# Patient Record
Sex: FEMALE | Race: OTHER | HISPANIC OR LATINO | URBAN - METROPOLITAN AREA
[De-identification: names, ages, dates, MRNs, and addresses within clinical notes are randomized per-mention and may not be internally consistent; named-entity substitution may affect disease eponyms.]

---

## 2024-04-22 ENCOUNTER — EMERGENCY (EMERGENCY)
Facility: HOSPITAL | Age: 69
LOS: 1 days | Discharge: HOME | End: 2024-04-22
Attending: EMERGENCY MEDICINE | Admitting: EMERGENCY MEDICINE
Payer: COMMERCIAL

## 2024-04-22 VITALS
OXYGEN SATURATION: 96 % | RESPIRATION RATE: 16 BRPM | DIASTOLIC BLOOD PRESSURE: 85 MMHG | TEMPERATURE: 97 F | HEART RATE: 59 BPM | SYSTOLIC BLOOD PRESSURE: 147 MMHG

## 2024-04-22 VITALS
SYSTOLIC BLOOD PRESSURE: 131 MMHG | HEIGHT: 70 IN | TEMPERATURE: 98 F | HEART RATE: 69 BPM | WEIGHT: 160.06 LBS | RESPIRATION RATE: 16 BRPM | OXYGEN SATURATION: 95 % | DIASTOLIC BLOOD PRESSURE: 76 MMHG
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
## 2024-04-22 DIAGNOSIS — I10 ESSENTIAL (PRIMARY) HYPERTENSION: ICD-10-CM

## 2024-04-22 DIAGNOSIS — S62.316A DISPLACED FRACTURE OF BASE OF FIFTH METACARPAL BONE, RIGHT HAND, INITIAL ENCOUNTER FOR CLOSED FRACTURE: ICD-10-CM

## 2024-04-22 DIAGNOSIS — Z79.01 LONG TERM (CURRENT) USE OF ANTICOAGULANTS: ICD-10-CM

## 2024-04-22 DIAGNOSIS — S00.83XA CONTUSION OF OTHER PART OF HEAD, INITIAL ENCOUNTER: ICD-10-CM

## 2024-04-22 DIAGNOSIS — S69.91XA UNSPECIFIED INJURY OF RIGHT WRIST, HAND AND FINGER(S), INITIAL ENCOUNTER: ICD-10-CM
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
## 2024-04-22 DIAGNOSIS — Z86.73 PERSONAL HISTORY OF TRANSIENT ISCHEMIC ATTACK (TIA), AND CEREBRAL INFARCTION WITHOUT RESIDUAL DEFICITS: ICD-10-CM

## 2024-04-22 PROCEDURE — 70486 CT MAXILLOFACIAL W/O DYE: CPT | Mod: MC

## 2024-04-22 PROCEDURE — 73130 X-RAY EXAM OF HAND: CPT | Mod: 26,RT

## 2024-04-22 PROCEDURE — 70450 CT HEAD/BRAIN W/O DYE: CPT | Mod: MC

## 2024-04-22 PROCEDURE — 70486 CT MAXILLOFACIAL W/O DYE: CPT | Mod: 26,MC

## 2024-04-22 PROCEDURE — 99284 EMERGENCY DEPT VISIT MOD MDM: CPT | Mod: 25

## 2024-04-22 PROCEDURE — 99285 EMERGENCY DEPT VISIT HI MDM: CPT | Mod: 57

## 2024-04-22 PROCEDURE — 73130 X-RAY EXAM OF HAND: CPT

## 2024-04-22 PROCEDURE — 29125 APPL SHORT ARM SPLINT STATIC: CPT | Mod: RT

## 2024-04-22 PROCEDURE — 70450 CT HEAD/BRAIN W/O DYE: CPT | Mod: 26,MC

## 2024-04-22 PROCEDURE — 26600 TREAT METACARPAL FRACTURE: CPT | Mod: 54,RT

## 2024-04-22 RX ORDER — ACETAMINOPHEN 500 MG
1000 TABLET ORAL ONCE
Refills: 0 | Status: COMPLETED | OUTPATIENT
Start: 2024-04-22 | End: 2024-04-22

## 2024-04-22 RX ORDER — TRAMADOL HYDROCHLORIDE 50 MG/1
1 TABLET ORAL
Qty: 15 | Refills: 0
Start: 2024-04-22 | End: 2024-04-26

## 2024-04-22 RX ADMIN — Medication 1000 MILLIGRAM(S): at 14:23

## 2024-04-22 NOTE — ED PROVIDER NOTE - ATTENDING APP SHARED VISIT CONTRIBUTION OF CARE
68 yo F h/o cva, htn, on eliquis c/o mechanical trip and fall PTA w chi - hit chin on pavement and R hand pain.  + pain in chin, teeth/bite feel normal, denies other facial pain, ha, change in vision/speech/gait, numbness or weakness in ext, neck/back pain, other injury except R hand.  R hand dominant.  Well appearing, nad, nc, ecchymosis, hematoma and abrasion on chin, dentition intact, nl bite, lung cta, heart reg, abd soft, nt, ext no gross deformity, R hand w + swelling, ecchymosis and ttp lateral hand, no other RUE bony ttp, radial 1+, neck and back nontender midline, awake, alert, oriented x 3, CN II-XII grossly intact, motor 5/5, no gross sens deficits, speech clear   Pt s/p fall w chi, R hand injury.  Plan ct head, face and xray hand, pain meds, discussed wound care and signs/sx of infection and chi.  Reassess.

## 2024-04-22 NOTE — ED PROVIDER NOTE - NSFOLLOWUPINSTRUCTIONS_ED_ALL_ED_FT
DESCANSO, HIELO, ELEVAR LA MANO, LA FÉRULA DEBE PERMANECER PUESTA, UTILIZAR EL CABESTESTRE, HIELO PARA LA BABAR, MEDICAMENTOS PARA EL DOLOR SEGÚN SEA NECESARIO, SEGUIMIENTO CON EL MÉDICO DE LA MANO EN 1 SEMANA, REGRESAR A Urgencias si cualquier empeoramiento o síntomas preocupantes  Tratamiento RICE para cuidados de rutina de lesiones  RICE Therapy for Routine Care of Injuries  Muchas lesiones pueden tratarse con reposo, hielo, compresión y elevación (terapia RHCE). [place] incluye:  Descansar la jackie del cuerpo lesionada.  Aplicar hielo sobre la lesión.  Ejercer presión (compresión) sobre la lesión.  Elevar la parte lesionada (elevación).  La terapia RHCE puede ayudar a aliviar el dolor y reducir la hinchazón.  Suministros necesarios:  Hielo.  Bolsa plástica.  Toalla.  Vendaje elástico.  [place] o varias almohadas para mantener en alto la parte lesionada del cuerpo.  Cómo cuidar la lesión con la terapia RHCE  Reposo  Intente descansar la parte del cuerpo lesionada. Puede retomar keeley actividades normales cuando el médico lo autorice a hacerlas y cuando pueda realizarlas sin dolor.  Si nikolay reposar demasiado la jackie de la lesión, es posible que no se cure yamel. Algunas lesiones se curan mejor con el movimiento temprano en lugar de demasiado reposo. Pregúntele al médico si debe realizar ejercicios para ayudar a que la lesión mejore.  Hielo  Aplique hielo sobre la jackie lesionada si se lo indican. Para hacer esto:  Ponga el hielo en franklin bolsa plástica.  Coloque franklin toalla entre la piel y la bolsa.  Aplique el hielo vic 20 minutos, 2 o 3 veces por día.  Retire el hielo si la piel se le pone de color shaikh brillante. [place] es muy importante. Si no puede sentir dolor, calor o frío, tiene un mayor riesgo de que se dañe la jackie.  No se aplique hielo directamente sobre la piel. Use hielo vic tantos días michelle le indique el médico.  Compresión  Aplique presión sobre la jackie lesionada. [place] se puede realizar con franklin venda elástica. Si le colocaron claus tipo de venda en la lesión:  Siga las instrucciones del paquete donde venía la venda para saber cómo usarla.  No ajuste demasiado la venda.  Afloje la venda si alguna jackie del cuerpo cerca de la venda se torna de color miguel ángel, está hinchada, se enfría o le causa dolor, o si pierde la sensibilidad en navid área.  Quítese la venda y vuelva a ponérsela cada 3 a 4 horas o michelle se lo haya indicado el médico.  Consulte al médico si la venda parece empeorar los problemas.  Elevación  Cuando esté sentado o acostado, mantenga la jackie lesionada por encima del nivel del corazón.  Siga estas instrucciones en ceja casa:  Si keeley síntomas empeoran o west mucho tiempo, programe franklin chelsea de seguimiento con el médico. También pueden tener que hacerle pruebas de diagnóstico por imágenes, michelle radiografías o franklin resonancia magnética (RM).  Si le hacen pruebas de diagnóstico por imágenes, pregunte cómo obtener keeley resultados cuando estén listos.  Retome keeley actividades normales cuando el médico le diga que es seguro.  Concurra a todas las visitas de seguimiento.  Comuníquese con un médico si:  El dolor y la hinchazón continúan.  Keeley síntomas empeoran.  Solicite ayuda de inmediato si:  Siente un dolor muy intenso y repentino en la jackie de la lesión o por debajo de carmen.  Tiene irritación o más hinchazón alrededor de la lesión.  Siente hormigueo o entumecimiento en la jackie de la lesión o por debajo de carmen y no desaparece después de quitarse la venda.  Resumen  Muchas lesiones se pueden tratar con reposo, hielo, compresión y elevación (terapia RHCE).  Puede retomar keeley actividades normales cuando el médico lo autorice y cuando pueda realizarlas sin dolor.  Aplique hielo sobre la jackie lesionada, michelle se lo haya indicado el médico.  Busque ayuda si los síntomas empeoran o si el dolor y la hinchazón continúan.

## 2024-04-22 NOTE — ED PROVIDER NOTE - CARE PROVIDERS DIRECT ADDRESSES
[email or web address]

## 2024-04-22 NOTE — ED PROVIDER NOTE - OBJECTIVE STATEMENT
The pt is a 68 y/o F, who presents to ED c/o trip and fall PTA - hit chin on pavement and injured R hand. Takes eliquis. Pt c/o pain to chin and hand, + swelling, + bruising, pain is 6/10, has not taken any pain meds. Denies loc, ha, visual changes, hearing changes, neck of back tend, cp, sob, numbness or tingling to fingers, leg or hip injury.

## 2024-04-22 NOTE — ED PROVIDER NOTE - PATIENT PORTAL LINK FT
You can access the FollowMyHealth Patient Portal offered by [facility] by registering at the following website: [email or web address]. By joining [employer]’s FollowMyHealth portal, you will also be able to view your health information using other applications (apps) compatible with our system.

## 2024-04-22 NOTE — ED PROVIDER NOTE - CLINICAL SUMMARY MEDICAL DECISION MAKING FREE TEXT BOX
pt s/p mechanical fall, sustained R hand (dominant) and chin injury, no loc, + eliquis use. xray       , ct pt s/p mechanical fall, sustained R hand (dominant) and chin injury, no loc, + eliquis use. xray + metacarpal fx -- splinted, ct neg, stable for dc, to RICE and f/u w/hand surg, head injury precautions discussed, pt understands and agrees w/plan, strict return precautions given

## 2024-04-22 NOTE — ED ADULT NURSE NOTE - NSFALLHARMRISKINTERV_ED_ALL_ED

## 2024-04-22 NOTE — ED PROVIDER NOTE - MUSCULOSKELETAL, MLM
Spine appears normal, range of motion is not limited, no muscle or joint tenderness; R hand: + swelling to dorsum of 4th metacarpal, + tend to site, FROM, radial pulse 2+, no tend over distal radius or ulna.

## 2024-04-22 NOTE — ED ADULT NURSE NOTE - CHIEF COMPLAINT QUOTE
Pt presents to ED S/P mechanical trip and "fall on uneven pavement on [address]" PTA. + head strike, Denies LOC. Pt c/o pain to chin and R hand with bruising and swelling. Pt has hematoma to chin. On Eliquis. Greenlandic speaking, hospital  offered.

## 2024-04-22 NOTE — ED ADULT NURSE NOTE - OBJECTIVE STATEMENT
68 y/o F c/o pain to the chin,  right hand and bilateral knees s/p mechanical fall. pt stated " she missed a step on uneven pavement.  swelling and bruise noted to the chin, and right hand. pt endorsed taking Eliquis. denies LOC, dizziness.

## 2024-04-22 NOTE — ED PROVIDER NOTE - ENMT, MLM
Airway patent, Nasal mucosa clear. Mouth with normal mucosa. no dental malocclusion, no dental fx. Throat has no vesicles, no oropharyngeal exudates and uvula is midline. + swelling and ecchymosis to chin, no mandibular tend, able to open/close mouth fully

## 2024-04-22 NOTE — ED ADULT TRIAGE NOTE - CHIEF COMPLAINT QUOTE
Pt presents to ED S/P mechanical trip and "fall on uneven pavement on [address]" PTA. + head strike, Denies LOC. Pt c/o pain to chin and R hand with bruising and swelling. Pt has hematoma to chin. On Eliquis. Danish speaking, hospital  offered.

## 2024-04-22 NOTE — ED PROVIDER NOTE - CARE PROVIDER_API CALL
Jaylen Plasencia  Plastic Surgery  [address] [place] [address]  Phone: [phone]  Fax: [phone]  Follow Up Time:     Phillip Wiseman  Orthopaedic Surgery  [address] [place] [address], [address]  [place] [address]  Phone: [phone]  Fax: [phone]  Follow Up Time:

## 2024-04-24 ENCOUNTER — EMERGENCY (EMERGENCY)
Facility: HOSPITAL | Age: 69
LOS: 1 days | Discharge: HOME | End: 2024-04-24
Admitting: STUDENT IN AN ORGANIZED HEALTH CARE EDUCATION/TRAINING PROGRAM
Payer: COMMERCIAL

## 2024-04-24 VITALS
HEART RATE: 62 BPM | WEIGHT: 167.55 LBS | RESPIRATION RATE: 20 BRPM | SYSTOLIC BLOOD PRESSURE: 125 MMHG | HEIGHT: 70 IN | DIASTOLIC BLOOD PRESSURE: 69 MMHG | TEMPERATURE: 99 F | OXYGEN SATURATION: 95 %

## 2024-04-24 DIAGNOSIS — S00.83XA CONTUSION OF OTHER PART OF HEAD, INITIAL ENCOUNTER: ICD-10-CM
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
## 2024-04-24 DIAGNOSIS — M79.89 OTHER SPECIFIED SOFT TISSUE DISORDERS: ICD-10-CM

## 2024-04-24 DIAGNOSIS — Z79.01 LONG TERM (CURRENT) USE OF ANTICOAGULANTS: ICD-10-CM

## 2024-04-24 PROBLEM — I63.9 CEREBRAL INFARCTION, UNSPECIFIED: Chronic | Status: ACTIVE | Noted: 2024-04-22

## 2024-04-24 PROBLEM — I10 ESSENTIAL (PRIMARY) HYPERTENSION: Chronic | Status: ACTIVE | Noted: 2024-04-22

## 2024-04-24 PROCEDURE — 99282 EMERGENCY DEPT VISIT SF MDM: CPT

## 2024-04-24 PROCEDURE — 99283 EMERGENCY DEPT VISIT LOW MDM: CPT

## 2024-04-24 RX ORDER — APIXABAN 2.5 MG/1
1 TABLET, FILM COATED ORAL
Refills: 0 | DISCHARGE

## 2024-04-24 RX ORDER — METOPROLOL TARTRATE 50 MG
1 TABLET ORAL
Refills: 0 | DISCHARGE

## 2024-04-24 NOTE — ED PROVIDER NOTE - MUSCULOSKELETAL, MLM
Spine appears normal, range of motion is not limited, no muscle or joint tenderness; R hand: + ulnar gutter splint in place, sling not on appropriately - repositioned to ensure actual elevation of hand instead of pointing down, all fingers warm and pink and well perfused, cap refill < 2sec, + light touch, able to wiggle, min swelling (unchanged)

## 2024-04-24 NOTE — ED ADULT TRIAGE NOTE - CHIEF COMPLAINT QUOTE
Pt presents to ED c/o worsening L hand swelling after fall yesterday. PT was seen here yesterday for the same complaint. Pt has bandage on L arm, still with bruising on the chin. Denies any complaints. Pt A&Ox4, conversive in full sentences' and ambulatory.

## 2024-04-24 NOTE — ED PROVIDER NOTE - ENMT, MLM
Airway patent, Nasal mucosa clear. Mouth with normal mucosa. Throat has no vesicles, no oropharyngeal exudates and uvula is midline. + ecchymosis to chin, no pus, no bleeding

## 2024-04-24 NOTE — ED PROVIDER NOTE - PATIENT PORTAL LINK FT
You can access the FollowMyHealth Patient Portal offered by [facility] by registering at the following website: [email or web address]. By joining [employer]’s FollowMyHealth portal, you will also be able to view your health information using other applications (apps) compatible with our system.

## 2024-04-24 NOTE — ED ADULT NURSE NOTE - NSFALLRISKINTERV_ED_ALL_ED

## 2024-04-24 NOTE — ED PROVIDER NOTE - OBJECTIVE STATEMENT
The pt is a 68 y/o F, who returns to ED c/o persistent swelling to R fingers and bruising to chin - was seen on 4/22 s/p fall, sustained contusion to chin and r hand fx (had CTs, xray, was splinted and placed in sling). Has not been icing or elevating, here for eval of persistent swelling to fingers and bruising to chin. Denies increased pain, numbness or tingling to fingers, any other c/o.

## 2024-04-24 NOTE — ED PROVIDER NOTE - CLINICAL SUMMARY MEDICAL DECISION MAKING FREE TEXT BOX
pt was seen 2 d ago for r hand fx and facial contusion (had imaging and was splinted), returns for eval of con't chin bruising and finger swelling, pt observed to have a sling on w/hand pointing down w/o any degree of elevation hence repositioned and pt again educated on proper placement, fingers warm and well perfused w/o any concern for compartment syndrome, no numbness or pain. chin ecchymosis unchanged. pt explained that bruising will persist for probably a wk, to RICE and f/u w/hand surg for eval of hand fx. pt expressed understanding, all questions answered.

## 2024-04-24 NOTE — ED PROVIDER NOTE - NSFOLLOWUPINSTRUCTIONS_ED_ALL_ED_FT
DEBE ELEVAR LA MANO, EL HIELO, EL DIAZ DEBE ESTAR PUESTO JESSICA EL DÍA, PATTI UN SEGUIMIENTO CON UN MÉDICO DE CHRIS JIM SE MENCIONÓ JESSICA LA ÚLTIMA VISITA.    Tratamiento RICE para cuidados de rutina de lesiones  RICE Therapy for Routine Care of Injuries  Muchas lesiones pueden tratarse con reposo, hielo, compresión y elevación (terapia RHCE). [place] incluye:  Descansar la jackie del cuerpo lesionada.  Aplicar hielo sobre la lesión.  Ejercer presión (compresión) sobre la lesión.  Elevar la parte lesionada (elevación).  La terapia RHCE puede ayudar a aliviar el dolor y reducir la hinchazón.  Suministros necesarios:  Hielo.  Bolsa plástica.  Toalla.  Vendaje elástico.  [place] o varias almohadas para mantener en alto la parte lesionada del cuerpo.  Cómo cuidar la lesión con la terapia RHCE  Reposo  Intente descansar la parte del cuerpo lesionada. Puede retomar keeley actividades normales cuando el médico lo autorice a hacerlas y cuando pueda realizarlas sin dolor.  Si nikolay reposar demasiado la jackie de la lesión, es posible que no se cure yamel. Algunas lesiones se curan mejor con el movimiento temprano en lugar de demasiado reposo. Pregúntele al médico si debe realizar ejercicios para ayudar a que la lesión mejore.  Hielo  Aplique hielo sobre la jackie lesionada si se lo indican. Para hacer esto:  Ponga el hielo en franklin bolsa plástica.  Coloque franklin toalla entre la piel y la bolsa.  Aplique el hielo jessica 20 minutos, 2 o 3 veces por día.  Retire el hielo si la piel se le pone de color shaikh brillante. [place] es muy importante. Si no puede sentir dolor, calor o frío, tiene un mayor riesgo de que se dañe la jackie.  No se aplique hielo directamente sobre la piel. Use hielo jessica tantos días jim le indique el médico.  Compresión  Aplique presión sobre la jackie lesionada. [place] se puede realizar con franklin venda elástica. Si le colocaron claus tipo de venda en la lesión:  Siga las instrucciones del paquete donde venía la venda para saber cómo usarla.  No ajuste demasiado la venda.  Afloje la venda si alguna jackie del cuerpo cerca de la venda se torna de color miguel ángel, está hinchada, se enfría o le causa dolor, o si pierde la sensibilidad en navid área.  Quítese la venda y vuelva a ponérsela cada 3 a 4 horas o jim se lo haya indicado el médico.  Consulte al médico si la venda parece empeorar los problemas.  Elevación  Cuando esté sentado o acostado, mantenga la jackie lesionada por encima del nivel del corazón.  Siga estas instrucciones en ceja casa:  Si keeley síntomas empeoran o west mucho tiempo, programe franklin chelsea de seguimiento con el médico. También pueden tener que hacerle pruebas de diagnóstico por imágenes, jim radiografías o franklin resonancia magnética (RM).  Si le hacen pruebas de diagnóstico por imágenes, pregunte cómo obtener keeley resultados cuando estén listos.  Retome keeley actividades normales cuando el médico le diga que es seguro.  Concurra a todas las visitas de seguimiento.  Comuníquese con un médico si:  El dolor y la hinchazón continúan.  Keeley síntomas empeoran.  Solicite ayuda de inmediato si:  Siente un dolor muy intenso y repentino en la jackie de la lesión o por debajo de carmen.  Tiene irritación o más hinchazón alrededor de la lesión.  Siente hormigueo o entumecimiento en la jackie de la lesión o por debajo de carmen y no desaparece después de quitarse la venda.  Resumen  Muchas lesiones se pueden tratar con reposo, hielo, compresión y elevación (terapia RHCE).  Puede retomar keeley actividades normales cuando el médico lo autorice y cuando pueda realizarlas sin dolor.  Aplique hielo sobre la jackie lesionada, jim se lo haya indicado el médico.  Busque ayuda si los síntomas empeoran o si el dolor y la hinchazón continúan.

## 2024-05-01 PROBLEM — Z00.00 ENCOUNTER FOR PREVENTIVE HEALTH EXAMINATION: Status: ACTIVE | Noted: 2024-05-01

## 2024-05-02 ENCOUNTER — APPOINTMENT (OUTPATIENT)
Dept: ORTHOPEDIC SURGERY | Facility: CLINIC | Age: 69
End: 2024-05-02
Payer: MEDICAID

## 2024-05-02 ENCOUNTER — RESULT REVIEW (OUTPATIENT)
Age: 69
End: 2024-05-02

## 2024-05-02 ENCOUNTER — OUTPATIENT (OUTPATIENT)
Dept: OUTPATIENT SERVICES | Facility: HOSPITAL | Age: 69
LOS: 1 days | End: 2024-05-02
Payer: MEDICAID

## 2024-05-02 VITALS
HEIGHT: 67 IN | BODY MASS INDEX: 27.31 KG/M2 | HEART RATE: 63 BPM | OXYGEN SATURATION: 95 % | DIASTOLIC BLOOD PRESSURE: 84 MMHG | SYSTOLIC BLOOD PRESSURE: 124 MMHG | WEIGHT: 174 LBS

## 2024-05-02 PROCEDURE — 99203 OFFICE O/P NEW LOW 30 MIN: CPT

## 2024-05-02 PROCEDURE — 73130 X-RAY EXAM OF HAND: CPT | Mod: 26,RT

## 2024-05-02 PROCEDURE — 73130 X-RAY EXAM OF HAND: CPT

## 2024-05-16 ENCOUNTER — APPOINTMENT (OUTPATIENT)
Dept: ORTHOPEDIC SURGERY | Facility: CLINIC | Age: 69
End: 2024-05-16
Payer: MEDICAID

## 2024-05-16 VITALS — WEIGHT: 174 LBS | HEIGHT: 67 IN | BODY MASS INDEX: 27.31 KG/M2 | RESPIRATION RATE: 16 BRPM

## 2024-05-16 DIAGNOSIS — Z78.9 OTHER SPECIFIED HEALTH STATUS: ICD-10-CM

## 2024-05-16 DIAGNOSIS — Z86.79 PERSONAL HISTORY OF OTHER DISEASES OF THE CIRCULATORY SYSTEM: ICD-10-CM

## 2024-05-16 DIAGNOSIS — Z86.73 PERSONAL HISTORY OF TRANSIENT ISCHEMIC ATTACK (TIA), AND CEREBRAL INFARCTION W/OUT RESIDUAL DEFICITS: ICD-10-CM

## 2024-05-16 PROCEDURE — 73130 X-RAY EXAM OF HAND: CPT | Mod: RT,F9

## 2024-05-16 PROCEDURE — 99204 OFFICE O/P NEW MOD 45 MIN: CPT | Mod: 57

## 2024-05-16 PROCEDURE — 26600 TREAT METACARPAL FRACTURE: CPT | Mod: F9

## 2024-05-16 RX ORDER — METOPROLOL TARTRATE 75 MG/1
TABLET, FILM COATED ORAL
Refills: 0 | Status: ACTIVE | COMMUNITY

## 2024-05-16 RX ORDER — APIXABAN 5 MG/1
TABLET, FILM COATED ORAL
Refills: 0 | Status: ACTIVE | COMMUNITY

## 2024-05-17 ENCOUNTER — APPOINTMENT (OUTPATIENT)
Dept: ORTHOPEDIC SURGERY | Facility: CLINIC | Age: 69
End: 2024-05-17

## 2024-05-18 NOTE — PHYSICAL EXAM
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 Some echcymosis volar aspect hand, skin intact. Remainder exam wnl  [de-identified] : Base 5th MC fx, extra-articular  fx minimal angulation, shortening. no comminution, trasnverse fx at base

## 2024-05-18 NOTE — ASSESSMENT
[FreeTextEntry1] : 69y F South Korean speaking RHD pmh htn, cva on eliquis presenting with malrotated base 5th MC fx nwb ulnar gutter splint pain control f.u. with Dr. Wiseman office for pos surgical management  Dr. Wiseman informed, Dr. donovan agrees with plan

## 2024-05-18 NOTE — HISTORY OF PRESENT ILLNESS
[de-identified] : 69y Samoan speaking  F pmh htn cva on eliquis presented to Ed 4/22 after fall on RH ( RHD) with 5th finger pain. Denies numbness, tingling or other complaints other than weakness and pain with finger flexion.,  She was seen in [facility] ED, ortho was not consulted at the time. Here for followup for continued care of injury

## 2024-05-18 NOTE — DISCUSSION/SUMMARY
[de-identified] : All medical record entries made by "residents name" acting as a scribe for this encounter under the direction of "Dr. Justice." I have reviewed the chart and agree that the record accurately reflects my personal performance of the history, physical exam, assessment and plan. I have also personally directed, reviewed and agreed with the chart.

## 2024-05-23 ENCOUNTER — APPOINTMENT (OUTPATIENT)
Dept: ORTHOPEDIC SURGERY | Facility: CLINIC | Age: 69
End: 2024-05-23
Payer: MEDICAID

## 2024-05-23 VITALS — BODY MASS INDEX: 27.31 KG/M2 | HEIGHT: 67 IN | WEIGHT: 174 LBS

## 2024-05-23 DIAGNOSIS — S80.01XA CONTUSION OF RIGHT KNEE, INITIAL ENCOUNTER: ICD-10-CM

## 2024-05-23 PROCEDURE — 99203 OFFICE O/P NEW LOW 30 MIN: CPT | Mod: 25

## 2024-05-23 PROCEDURE — 73564 X-RAY EXAM KNEE 4 OR MORE: CPT | Mod: LT,RT

## 2024-05-23 RX ORDER — MELOXICAM 15 MG/1
15 TABLET ORAL DAILY
Qty: 30 | Refills: 2 | Status: ACTIVE | COMMUNITY
Start: 2024-05-23 | End: 1900-01-01

## 2024-05-23 NOTE — DISCUSSION/SUMMARY
[de-identified] : 69 year old fell injuring right wrist and bruising right knee. No evidence or ligament or meniscal damage. Probable patellar tendon /patellar contusion.  Plan  Offered cortisone injection but she did not want that. Meloxicam PO daily for 3 weeks  Physical Therapy for QIuad core and glutes.  F/U 6-8 weeks

## 2024-05-23 NOTE — PHYSICAL EXAM
[de-identified] : Right knee minimal tenderness patellar tendon with full active rom.  Neg medial and lateral or patellar tenderness.  ROM 0-140 degrees.  [de-identified] : 4 views of her knees show no fx subluxation KL2 changes only

## 2024-05-23 NOTE — HISTORY OF PRESENT ILLNESS
[de-identified] : AYLIN HAND is a 69 year  old  F patient that presents today with right knee pain that started on April 25,2024 when she fell while walking on the street and hurt her right knee.

## 2024-06-06 ENCOUNTER — APPOINTMENT (OUTPATIENT)
Dept: ORTHOPEDIC SURGERY | Facility: CLINIC | Age: 69
End: 2024-06-06
Payer: MEDICAID

## 2024-06-06 DIAGNOSIS — S62.309A UNSPECIFIED FRACTURE OF UNSPECIFIED METACARPAL BONE, INITIAL ENCOUNTER FOR CLOSED FRACTURE: ICD-10-CM

## 2024-06-06 PROCEDURE — 99024 POSTOP FOLLOW-UP VISIT: CPT

## 2024-06-06 PROCEDURE — 73130 X-RAY EXAM OF HAND: CPT | Mod: RT

## 2024-07-03 ENCOUNTER — APPOINTMENT (OUTPATIENT)
Dept: ORTHOPEDIC SURGERY | Facility: CLINIC | Age: 69
End: 2024-07-03
Payer: MEDICAID

## 2024-07-03 DIAGNOSIS — S80.01XA CONTUSION OF RIGHT KNEE, INITIAL ENCOUNTER: ICD-10-CM

## 2024-07-03 PROCEDURE — 99213 OFFICE O/P EST LOW 20 MIN: CPT
